# Patient Record
Sex: MALE | URBAN - METROPOLITAN AREA
[De-identification: names, ages, dates, MRNs, and addresses within clinical notes are randomized per-mention and may not be internally consistent; named-entity substitution may affect disease eponyms.]

---

## 2019-02-14 ENCOUNTER — OFFICE VISIT (OUTPATIENT)
Dept: PRIMARY CARE CLINIC | Age: 34
End: 2019-02-14

## 2019-02-14 VITALS
SYSTOLIC BLOOD PRESSURE: 140 MMHG | BODY MASS INDEX: 25.8 KG/M2 | HEIGHT: 69 IN | WEIGHT: 174.2 LBS | DIASTOLIC BLOOD PRESSURE: 90 MMHG | TEMPERATURE: 97.3 F | HEART RATE: 73 BPM | RESPIRATION RATE: 17 BRPM | OXYGEN SATURATION: 100 %

## 2019-02-14 DIAGNOSIS — E78.1 HYPERTRIGLYCERIDEMIA: Primary | ICD-10-CM

## 2019-02-14 DIAGNOSIS — R10.9 ABDOMINAL PAIN, UNSPECIFIED ABDOMINAL LOCATION: ICD-10-CM

## 2019-02-14 DIAGNOSIS — Z83.49 FAMILY HISTORY OF THYROID DISEASE: ICD-10-CM

## 2019-02-14 DIAGNOSIS — R03.0 ELEVATED BLOOD-PRESSURE READING WITHOUT DIAGNOSIS OF HYPERTENSION: ICD-10-CM

## 2019-02-14 NOTE — PATIENT INSTRUCTIONS
Elevated Blood Pressure: Care Instructions Your Care Instructions Blood pressure is a measure of how hard the blood pushes against the walls of your arteries. It's normal for blood pressure to go up and down throughout the day. But if it stays up over time, you have high blood pressure. Two numbers tell you your blood pressure. The first number is the systolic pressure. It shows how hard the blood pushes when your heart is pumping. The second number is the diastolic pressure. It shows how hard the blood pushes between heartbeats, when your heart is relaxed and filling with blood. An ideal blood pressure in adults is less than 120/80 (say \"120 over 80\"). High blood pressure is 140/90 or higher. You have high blood pressure if your top number is 140 or higher or your bottom number is 90 or higher, or both. The main test for high blood pressure is simple, fast, and painless. To diagnose high blood pressure, your doctor will test your blood pressure at different times. After testing your blood pressure, your doctor may ask you to test it again when you are home. If you are diagnosed with high blood pressure, you can work with your doctor to make a long-term plan to manage it. Follow-up care is a key part of your treatment and safety. Be sure to make and go to all appointments, and call your doctor if you are having problems. It's also a good idea to know your test results and keep a list of the medicines you take. How can you care for yourself at home? · Do not smoke. Smoking increases your risk for heart attack and stroke. If you need help quitting, talk to your doctor about stop-smoking programs and medicines. These can increase your chances of quitting for good. · Stay at a healthy weight. · Try to limit how much sodium you eat to less than 2,300 milligrams (mg) a day. Your doctor may ask you to try to eat less than 1,500 mg a day. · Be physically active. Get at least 30 minutes of exercise on most days of the week. Walking is a good choice. You also may want to do other activities, such as running, swimming, cycling, or playing tennis or team sports. · Avoid or limit alcohol. Talk to your doctor about whether you can drink any alcohol. · Eat plenty of fruits, vegetables, and low-fat dairy products. Eat less saturated and total fats. · Learn how to check your blood pressure at home. When should you call for help? Call your doctor now or seek immediate medical care if: 
? · Your blood pressure is much higher than normal (such as 180/110 or higher). ? · You think high blood pressure is causing symptoms such as: ¨ Severe headache. ¨ Blurry vision. ? Watch closely for changes in your health, and be sure to contact your doctor if: 
? · You do not get better as expected. Where can you learn more? Go to http://kit-loretta.info/. Enter J882 in the search box to learn more about \"Elevated Blood Pressure: Care Instructions. \" Current as of: September 21, 2016 Content Version: 11.4 © 9288-5292 [employer]. Care instructions adapted under license by [employer] (which disclaims liability or warranty for this information). If you have questions about a medical condition or this instruction, always ask your healthcare professional. [address] any warranty or liability for your use of this information.

## 2019-02-14 NOTE — PROGRESS NOTES
Subjective: Chief Complaint Patient presents with [address] [place] [address] Care  Abdominal Pain  
  lower abdominal pain 3-4 month  Cholesterol Problem  
  had cholesterol checked a few months He  is a 34y.o. year old male from [facility] with no significant medical history who presents with his wife as a new patient to [facility]. Reports that that he moved form [facility] about 6 months ago. Patient brought some lab results for me to review which was done in December for health screening. TC :158 Tr HD: 24 LDL; 88 FBS : 92 BP was 137/81 Patient reports that he has been doing 40 minutes exercise 4 times/week started 4 months ago. Has been watching his diet more closely. Avoiding fatty, cano food. Patient reports that he has been exercising a mild sharp pain in his side of the abdomen in some morning which usually lasts about 5-6 minutes. Does not happen often. Notes that pain usually triggers if he eat too much food. Otherwise denies any N/V, constipation, blood in stool. Does not have abdominal bloating. Wondering if he has any colon infection. Counseled patient that according his history an exam its very unlikely that he has colitis. Advised to follow up if pain persist.  
 
Notes that his dad and sister is being treated for hypothyroidism. Would like to have check up. Denies any palpitation, chest pain, abnormal bowel habit. A comprehensive review of systems was negative except for that written in the HPI. Objective:  
 
Vitals:  
 19 1347 BP: (!) 145/97 Pulse: 73 Resp: 17 Temp: 97.3 °F (36.3 °C) TempSrc: Oral  
SpO2: 100% Weight: 174 lb 3.2 oz (79 kg) Height: 5' 8.5\" (1.74 m) Physical Examination: General appearance - alert, well appearing, and in no distress, oriented to person, place, and time and overweight Mental status - alert, oriented to person, place, and time, normal mood, behavior, speech, dress, motor activity, and thought processes Ears - bilateral TM's and external ear canals normal 
Nose - normal and patent, no erythema, discharge or polyps Mouth - mucous membranes moist, pharynx normal without lesions Neck - supple, no significant adenopathy. No thyromegaly. Chest - clear to auscultation, no wheezes, rales or rhonchi, symmetric air entry Heart - normal rate, regular rhythm, normal S1, S2, no murmurs, rubs, clicks or gallops Abdomen - soft, nontender, nondistended, no masses or organomegaly Neurological - alert, oriented, normal speech, no focal findings or movement disorder noted No Known Allergies Social History Socioeconomic History  Marital status:  Spouse name: Not on file  Number of children: Not on file  Years of education: Not on file  Highest education level: Not on file Tobacco Use  Smoking status: Never Smoker  Smokeless tobacco: Never Used Substance and Sexual Activity  Alcohol use: Yes Comment: occas  Drug use: No  
  
Family History Problem Relation Age of Onset  Thyroid Disease Father  Thyroid Disease Sister History reviewed. No pertinent surgical history. History reviewed. No pertinent past medical history. Current Outpatient Medications Medication Sig Dispense Refill  FIBER CHOICE PO Take 1 Cap by mouth as needed. Assessment/ Plan:  
Diagnoses and all orders for this visit: 
 
1. Hypertriglyceridemia -    Triglyceride is slightly elevated. counseled on continue to work on diet and exercise. LIPID PANEL 
-     HEPATIC FUNCTION PANEL 2. Family history of thyroid disease 
-     TSH AND FREE T4 
 
3. Elevated blood-pressure reading without diagnosis of hypertension - BP is elevated in office. No past Hx of elevated BP. Low salt diet, exercise, diet. Monitor BP daily and follow up in one month.   
 4. Abdominal pain:  
     -  Patient reports that he has been exercising a mild sharp pain in his side of the abdomen in some morning which usually lasts about 5-6 minutes. Does not happen often. Notes that pain usually triggers if he eat too much food. Otherwise denies any N/V, constipation, blood in stool. Does not have abdominal bloating. Wondering if he has any colon infection. Counseled patient that according his history an exam its very unlikely that he has colitis. Advised to follow up if pain persist.  
 
 
 
Medication risks/benefits/costs/interactions/alternatives discussed with patient. Advised patient to call back or return to office if symptoms worsen/change/persist. If patient cannot reach us or should anything more severe/urgent arise he/she should proceed directly to the nearest emergency department. Discussed expected course/resolution/complications of diagnosis in detail with patient. Patient given a written after visit summary which includes her diagnoses, current medications and vitals. Patient expressed understanding with the diagnosis and plan. Follow-up Disposition: 
Return in about 1 month (around 3/14/2019) for Bring BP log book. Milla Wheeler

## 2019-02-15 LAB
ALBUMIN SERPL-MCNC: 5 G/DL (ref 3.5–5.5)
ALP SERPL-CCNC: 64 IU/L (ref 39–117)
ALT SERPL-CCNC: 13 IU/L (ref 0–44)
AST SERPL-CCNC: 15 IU/L (ref 0–40)
BILIRUB DIRECT SERPL-MCNC: 0.11 MG/DL (ref 0–0.4)
BILIRUB SERPL-MCNC: 0.4 MG/DL (ref 0–1.2)
CHOLEST SERPL-MCNC: 156 MG/DL (ref 100–199)
HDLC SERPL-MCNC: 26 MG/DL
LDLC SERPL CALC-MCNC: 88 MG/DL (ref 0–99)
PROT SERPL-MCNC: 7.9 G/DL (ref 6–8.5)
T4 FREE SERPL-MCNC: 1.47 NG/DL (ref 0.82–1.77)
TRIGL SERPL-MCNC: 210 MG/DL (ref 0–149)
TSH SERPL DL<=0.005 MIU/L-ACNC: 2.99 UIU/ML (ref 0.45–4.5)
VLDLC SERPL CALC-MCNC: 42 MG/DL (ref 5–40)